# Patient Record
Sex: FEMALE | Race: WHITE | URBAN - METROPOLITAN AREA
[De-identification: names, ages, dates, MRNs, and addresses within clinical notes are randomized per-mention and may not be internally consistent; named-entity substitution may affect disease eponyms.]

---

## 2019-07-31 ENCOUNTER — APPOINTMENT (OUTPATIENT)
Dept: URBAN - METROPOLITAN AREA CLINIC 292 | Age: 59
Setting detail: DERMATOLOGY
End: 2019-07-31

## 2019-07-31 DIAGNOSIS — L81.4 OTHER MELANIN HYPERPIGMENTATION: ICD-10-CM

## 2019-07-31 DIAGNOSIS — L57.0 ACTINIC KERATOSIS: ICD-10-CM

## 2019-07-31 DIAGNOSIS — L82.1 OTHER SEBORRHEIC KERATOSIS: ICD-10-CM

## 2019-07-31 PROCEDURE — 17004 DESTROY PREMAL LESIONS 15/>: CPT

## 2019-07-31 PROCEDURE — OTHER LIQUID NITROGEN: OTHER

## 2019-07-31 PROCEDURE — 99203 OFFICE O/P NEW LOW 30 MIN: CPT | Mod: 25

## 2019-07-31 PROCEDURE — OTHER COUNSELING: OTHER

## 2019-07-31 ASSESSMENT — LOCATION SIMPLE DESCRIPTION DERM
LOCATION SIMPLE: LEFT CHEEK
LOCATION SIMPLE: RIGHT CHEEK

## 2019-07-31 ASSESSMENT — LOCATION DETAILED DESCRIPTION DERM
LOCATION DETAILED: LEFT MEDIAL MALAR CHEEK
LOCATION DETAILED: RIGHT INFERIOR CENTRAL MALAR CHEEK

## 2019-07-31 ASSESSMENT — LOCATION ZONE DERM: LOCATION ZONE: FACE

## 2019-07-31 NOTE — PROCEDURE: LIQUID NITROGEN
Post-Care Instructions: 1.  After treatment with liquid nitrogen there may be some burning sensation or pain that can last up to 24 hours.  The discomfort can be relieved with extra strength Tylenol or similar pain medications taken as directed.  \\n2.  Within 24 to 48 hours a blister may form containing clear or bloody fluid.  This is expected.  If the blister becomes larger than a nickel you may greene it with a sterile needle.\\n3.  Wast the area with soap and water twice daily.\\n4.  Apply a generous amount of Vaseline or Aquaphor ointment to the treated sites.\\n5.  Place a small dressing or Band-Aid over the wound.\\n\\nIf any problems occur, please contact our office at [phone] or go to the nearest Emergency Center. Post-Care Instructions: 1.  After treatment with liquid nitrogen there may be some burning sensation or pain that can last up to 24 hours.  The discomfort can be relieved with extra strength Tylenol or similar pain medications taken as directed.  \\n2.  Within 24 to 48 hours a blister may form containing clear or bloody fluid.  This is expected.  If the blister becomes larger than a nickel you may greene it with a sterile needle.\\n3.  Wast the area with soap and water twice daily.\\n4.  Apply a generous amount of Vaseline or Aquaphor ointment to the treated sites.\\n5.  Place a small dressing or Band-Aid over the wound.\\n\\nIf any problems occur, please contact our office at [phone] or go to the nearest Emergency Center.